# Patient Record
Sex: MALE | Race: WHITE | Employment: FULL TIME | URBAN - METROPOLITAN AREA
[De-identification: names, ages, dates, MRNs, and addresses within clinical notes are randomized per-mention and may not be internally consistent; named-entity substitution may affect disease eponyms.]

---

## 2017-03-14 PROCEDURE — 87086 URINE CULTURE/COLONY COUNT: CPT | Performed by: INTERNAL MEDICINE

## 2017-08-17 PROCEDURE — 87086 URINE CULTURE/COLONY COUNT: CPT | Performed by: INTERNAL MEDICINE

## 2017-08-17 PROCEDURE — 87077 CULTURE AEROBIC IDENTIFY: CPT | Performed by: INTERNAL MEDICINE

## 2017-08-17 PROCEDURE — 87186 SC STD MICRODIL/AGAR DIL: CPT | Performed by: INTERNAL MEDICINE

## 2017-11-20 PROBLEM — M17.0 PRIMARY OSTEOARTHRITIS OF BOTH KNEES: Status: ACTIVE | Noted: 2017-11-20

## 2018-05-17 PROBLEM — E66.01 MORBID OBESITY WITH BMI OF 40.0-44.9, ADULT (HCC): Status: ACTIVE | Noted: 2018-05-17

## 2018-05-17 PROBLEM — G25.2 INTENTION TREMOR: Status: ACTIVE | Noted: 2018-05-17

## 2018-05-17 PROBLEM — E78.5 DYSLIPIDEMIA: Status: ACTIVE | Noted: 2018-05-17

## 2018-08-03 PROCEDURE — 88305 TISSUE EXAM BY PATHOLOGIST: CPT | Performed by: INTERNAL MEDICINE

## 2018-12-10 PROCEDURE — 82043 UR ALBUMIN QUANTITATIVE: CPT | Performed by: INTERNAL MEDICINE

## 2018-12-10 PROCEDURE — 82570 ASSAY OF URINE CREATININE: CPT | Performed by: INTERNAL MEDICINE

## 2018-12-10 PROCEDURE — 84153 ASSAY OF PSA TOTAL: CPT | Performed by: INTERNAL MEDICINE

## 2019-01-29 PROBLEM — E88.810 METABOLIC SYNDROME: Status: ACTIVE | Noted: 2019-01-29

## 2019-01-29 PROBLEM — E88.81 INSULIN RESISTANCE: Status: ACTIVE | Noted: 2019-01-29

## 2019-01-29 PROBLEM — K76.0 FATTY LIVER: Status: ACTIVE | Noted: 2019-01-29

## 2019-01-29 PROBLEM — R73.01 IFG (IMPAIRED FASTING GLUCOSE): Status: ACTIVE | Noted: 2019-01-29

## 2019-01-29 PROBLEM — E88.819 INSULIN RESISTANCE: Status: ACTIVE | Noted: 2019-01-29

## 2019-01-29 PROBLEM — E88.81 METABOLIC SYNDROME: Status: ACTIVE | Noted: 2019-01-29

## 2019-02-07 PROCEDURE — 83525 ASSAY OF INSULIN: CPT | Performed by: INTERNAL MEDICINE

## 2020-01-10 PROBLEM — M46.94 THORACIC SPONDYLITIS (HCC): Status: ACTIVE | Noted: 2020-01-10

## 2020-01-10 PROBLEM — N18.30 CKD (CHRONIC KIDNEY DISEASE) STAGE 3, GFR 30-59 ML/MIN (HCC): Status: ACTIVE | Noted: 2020-01-10

## 2020-01-10 PROBLEM — I70.0 AORTIC ATHEROSCLEROSIS (HCC): Status: ACTIVE | Noted: 2020-01-10

## 2021-04-11 DIAGNOSIS — Z23 NEED FOR VACCINATION: ICD-10-CM

## 2023-10-30 RX ORDER — SERTRALINE HYDROCHLORIDE 25 MG/1
50 TABLET, FILM COATED ORAL DAILY
COMMUNITY

## 2023-10-30 RX ORDER — CLOPIDOGREL BISULFATE 75 MG/1
75 TABLET ORAL DAILY
COMMUNITY

## 2023-10-30 RX ORDER — ATORVASTATIN CALCIUM 10 MG/1
10 TABLET, FILM COATED ORAL NIGHTLY
COMMUNITY

## 2023-11-10 ENCOUNTER — HOSPITAL ENCOUNTER (OUTPATIENT)
Facility: HOSPITAL | Age: 68
Setting detail: HOSPITAL OUTPATIENT SURGERY
Discharge: HOME | End: 2023-11-10
Attending: INTERNAL MEDICINE | Admitting: INTERNAL MEDICINE
Payer: MEDICARE

## 2023-11-10 ENCOUNTER — ANESTHESIA (OUTPATIENT)
Dept: ENDOSCOPY | Facility: HOSPITAL | Age: 68
End: 2023-11-10
Payer: MEDICARE

## 2023-11-10 ENCOUNTER — ANESTHESIA EVENT (OUTPATIENT)
Dept: ENDOSCOPY | Facility: HOSPITAL | Age: 68
End: 2023-11-10
Payer: MEDICARE

## 2023-11-10 VITALS
TEMPERATURE: 97 F | BODY MASS INDEX: 37.93 KG/M2 | HEIGHT: 72 IN | RESPIRATION RATE: 16 BRPM | OXYGEN SATURATION: 97 % | HEART RATE: 64 BPM | SYSTOLIC BLOOD PRESSURE: 127 MMHG | DIASTOLIC BLOOD PRESSURE: 69 MMHG | WEIGHT: 280 LBS

## 2023-11-10 PROCEDURE — 0DJD8ZZ INSPECTION OF LOWER INTESTINAL TRACT, VIA NATURAL OR ARTIFICIAL OPENING ENDOSCOPIC: ICD-10-PCS | Performed by: INTERNAL MEDICINE

## 2023-11-10 RX ORDER — SODIUM CHLORIDE, SODIUM LACTATE, POTASSIUM CHLORIDE, CALCIUM CHLORIDE 600; 310; 30; 20 MG/100ML; MG/100ML; MG/100ML; MG/100ML
INJECTION, SOLUTION INTRAVENOUS CONTINUOUS
Status: DISCONTINUED | OUTPATIENT
Start: 2023-11-10 | End: 2023-11-10

## 2023-11-10 RX ORDER — NALOXONE HYDROCHLORIDE 0.4 MG/ML
80 INJECTION, SOLUTION INTRAMUSCULAR; INTRAVENOUS; SUBCUTANEOUS AS NEEDED
Status: DISCONTINUED | OUTPATIENT
Start: 2023-11-10 | End: 2023-11-10

## 2023-11-10 RX ORDER — LIDOCAINE HYDROCHLORIDE 10 MG/ML
INJECTION, SOLUTION EPIDURAL; INFILTRATION; INTRACAUDAL; PERINEURAL AS NEEDED
Status: DISCONTINUED | OUTPATIENT
Start: 2023-11-10 | End: 2023-11-10 | Stop reason: SURG

## 2023-11-10 RX ADMIN — LIDOCAINE HYDROCHLORIDE 50 MG: 10 INJECTION, SOLUTION EPIDURAL; INFILTRATION; INTRACAUDAL; PERINEURAL at 14:07:00

## 2023-11-10 NOTE — ANESTHESIA POSTPROCEDURE EVALUATION
[address] Patient Status:  Hospital Outpatient Surgery   Age/Gender 70year old male MRN [id number]   Location [address] [address] Attending Nehal Meredith MD   [facility] Day # 0 PCP PHYSICIAN NONSTAFF       Anesthesia Post-op Note    COLONOSCOPY    Procedure Summary       Date: 11/10/23 Room / Location: [facility] ENDOSCOPY 03 / [facility] ENDOSCOPY    Anesthesia Start: 8768 Anesthesia Stop:     Procedure: COLONOSCOPY Diagnosis: (Diverticulosis)    Surgeons: Nehal Meredith MD Anesthesiologist: Selma Del Real MD    Anesthesia Type: MAC ASA Status: 3            Anesthesia Type: MAC    Vitals Value Taken Time   BP 91/50 11/10/23 1422   Temp  11/10/23 1422   Pulse 57 11/10/23 1422   Resp 16 11/10/23 1422   SpO2 98 11/10/23 1422       Patient Location: Endoscopy    Anesthesia Type: MAC    Airway Patency: patent    Postop Pain Control: adequate    Mental Status: mildly sedated but able to meaningfully participate in the post-anesthesia evaluation    Nausea/Vomiting: none    Cardiopulmonary/Hydration status: stable euvolemic    Complications: no apparent anesthesia related complications    Postop vital signs: stable    Dental Exam: Unchanged from Preop    Patient to be discharged home when criteria met.

## 2023-11-10 NOTE — DISCHARGE INSTRUCTIONS
[facility]    Procedure(s): Colonoscopy    Findings:    1. Diverticulosis    Disposition:  home    Patient Instructions:     1. Consume a high fiber diet. 2.  Repeat colonoscopy in 5 years. Carolee Hernandez MD    Home Care Instructions for Colonoscopy with Sedation    Diet:  - Resume your regular diet as tolerated unless otherwise instructed. - Start with light meals to minimize bloating.  - Do not drink alcohol today. Medication:  - If you have questions about resuming your normal medications, please contact your Primary Care Physician. Activities:  - Take it easy today. Do not return to work today. - Do not drive today. - Do not operate any machinery today (including kitchen equipment). Colonoscopy:  - You may notice some rectal \"spotting\" (a little blood on the toilet tissue) for a day or two after the exam. This is normal.  - If you experience any rectal bleeding (not spotting), persistent tenderness or sharp severe abdominal pains, oral temperature over 100 degrees Fahrenheit, light-headedness or dizziness, or any other problems, contact your doctor. **If unable to reach your doctor, please go to the [facility] Emergency Room**    - Your referring physician will receive a full report of your examination.  - If you do not hear from your doctor's office within two weeks of your biopsy, please call them for your results. You may be able to see your laboratory results in [facility] between 4 and 7 business days. In some cases, your physician may not have viewed the results before they are released to [address] If you have questions regarding your results contact the physician who ordered the test/exam by phone or via [address] by choosing \"Ask a Medical Question. \"

## 2023-11-10 NOTE — OPERATIVE REPORT
[address] Patient Status:  Hospital Outpatient Surgery    1955 MRN [id number]   Location [address] [address] Attending Sonia Emmanuel MD   [address] Day # 0 PCP PHYSICIAN NONSTAFF         PATIENT NAME: Coreen Jones  DATE OF OPERATION: 11/10/2023    PREOPERATIVE DIAGNOSIS:  CRC screening Hx polyp  POSTOPERATIVE DIAGNOSIS:  Diverticulosis    PROCEDURE PERFORMED: Colonoscopy with MAC sedation  SURGEON: Sneha Posey MD   MEDICATIONS: MAC IV in divided doses under the supervision of Anesthesia. PROCEDURE AND FINDINGS: The patient was placed into the left lateral decubitus position after informed consent was obtained. All questions were answered. MAC IV sedation was administered. A rectal exam was performed which was normal.  The Olympus video colonoscope was then introduced through the rectum and advanced through the colon to the cecum. The quality of prep was excellent, adequate, Aronchick 1. The cecum was identified by the ileocecal valve and appendiceal orifice. The colonoscope was then withdrawn and the mucosa was further carefully inspected. There were diverticula noted in the entire colon. The remainder of the entire examined colon was otherwise normal.  After retroflexion in the rectum, the colonoscope was straightened and removed and the procedure was completed. The patient tolerated the procedure well. There were no implants placed nor significant blood loss. There were no immediate apparent complications. Moderate sedation time:  None. Deep sedation provided by anesthesia    RECOMMENDATIONS   Consume a high fiber diet. Repeat colonoscopy in 5 years    Sneha Posey MD

## (undated) DEVICE — 3M™ RED DOT™ MONITORING ELECTRODE WITH FOAM TAPE AND STICKY GEL, 50/BAG, 20/CASE, 72/PLT 2570: Brand: RED DOT™

## (undated) DEVICE — STERIS KITS

## (undated) DEVICE — 1200CC GUARDIAN II: Brand: GUARDIAN

## (undated) DEVICE — KIT VLV 5 PC AIR H2O SUCT BX ENDOGATOR CONN

## (undated) DEVICE — 10FT COMBINED O2 DELIVERY/CO2 MONITORING. FILTER WITH MICROSTREAM TYPE LUER: Brand: DUAL ADULT NASAL CANNULA